# Patient Record
Sex: MALE | Race: WHITE | HISPANIC OR LATINO | Employment: FULL TIME | URBAN - METROPOLITAN AREA
[De-identification: names, ages, dates, MRNs, and addresses within clinical notes are randomized per-mention and may not be internally consistent; named-entity substitution may affect disease eponyms.]

---

## 2021-03-30 ENCOUNTER — BULK ORDERING (OUTPATIENT)
Dept: CASE MANAGEMENT | Facility: OTHER | Age: 51
End: 2021-03-30

## 2021-03-30 DIAGNOSIS — Z23 IMMUNIZATION DUE: ICD-10-CM

## 2023-02-23 ENCOUNTER — OFFICE VISIT (OUTPATIENT)
Dept: FAMILY MEDICINE CLINIC | Facility: CLINIC | Age: 53
End: 2023-02-23
Payer: COMMERCIAL

## 2023-02-23 VITALS
SYSTOLIC BLOOD PRESSURE: 136 MMHG | TEMPERATURE: 97.1 F | RESPIRATION RATE: 14 BRPM | HEART RATE: 73 BPM | WEIGHT: 203.2 LBS | OXYGEN SATURATION: 97 % | BODY MASS INDEX: 27.52 KG/M2 | DIASTOLIC BLOOD PRESSURE: 80 MMHG | HEIGHT: 72 IN

## 2023-02-23 DIAGNOSIS — Z12.5 PROSTATE CANCER SCREENING: ICD-10-CM

## 2023-02-23 DIAGNOSIS — Z12.11 SCREEN FOR COLON CANCER: ICD-10-CM

## 2023-02-23 DIAGNOSIS — E56.9 VITAMIN DEFICIENCY: ICD-10-CM

## 2023-02-23 DIAGNOSIS — E55.9 VITAMIN D DEFICIENCY: ICD-10-CM

## 2023-02-23 DIAGNOSIS — R63.5 WEIGHT GAIN: ICD-10-CM

## 2023-02-23 DIAGNOSIS — Z00.00 HEALTH MAINTENANCE EXAMINATION: Primary | ICD-10-CM

## 2023-02-23 PROCEDURE — 93000 ELECTROCARDIOGRAM COMPLETE: CPT | Performed by: NURSE PRACTITIONER

## 2023-02-23 PROCEDURE — 99386 PREV VISIT NEW AGE 40-64: CPT | Performed by: NURSE PRACTITIONER

## 2023-02-23 RX ORDER — SILDENAFIL 100 MG/1
TABLET, FILM COATED ORAL
Qty: 30 TABLET | Refills: 5 | Status: SHIPPED | OUTPATIENT
Start: 2023-02-23

## 2023-02-23 RX ORDER — TRIAMCINOLONE ACETONIDE 1 MG/G
CREAM TOPICAL
COMMUNITY
Start: 2023-01-26

## 2023-02-23 RX ORDER — PHENTERMINE HYDROCHLORIDE 37.5 MG/1
37.5 TABLET ORAL
Qty: 30 TABLET | Refills: 0 | Status: SHIPPED | OUTPATIENT
Start: 2023-02-23 | End: 2023-03-23 | Stop reason: SDUPTHER

## 2023-02-23 NOTE — PROGRESS NOTES
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  Physical fitness is required for his job as well as to maintain a healthy weight he has difficulty getting his jacket on his rather uniform with a weight gain.  Over the last year.  Has a busy schedule,   And has eaten more fast food than his normal baseline.  He would like to discuss if there is any weight loss options pharmacological options at least temporarily.  Also he has noticed that erections do not have the quality as previous and is wondering if there is anything here that can be done his libido is good no voice changes and he has plenty of facial hair testosterone borderline low in the past he had discussions with his doctors at that time urology and it was felt that he needed no testosterone replacement.  Takes care of himself he likes to run tries to eat healthy.  He does not have any chest pain shortness of breath headaches dizziness he has no history of diabetes hypertension seizure disorder  Or other chronic health conditions  No history of drug abuse or addiction or strong family history or other contraindications to weight loss therapy.  He has not had a colonoscopy  PSA quite a few years ago    Past medical history healthy  Mild ldh elev    Social history  , lived in [place] he is traveled around here with a teenager and  12-year-old in [place] and 3 others living elsewhere in the country as he is traveled around.  He is    Family history no early prostate cancer or colon cancer    Social history continue  13  to18  2 go to [employer]  3 other children  No tob no etoh  "    Fh  Parents  diab  No prost can   No colon cancer                Objective   Vital Signs:  /80   Pulse 73   Temp 97.1 °F (36.2 °C) (Infrared)   Resp 14   Ht 182.9 cm (72\")   Wt 92.2 kg (203 lb 3.2 oz)   SpO2 97%   BMI 27.56 kg/m²   Estimated body mass index is 27.56 kg/m² as calculated from the following:    Height as of this encounter: 182.9 cm (72\").    Weight as of this encounter: 92.2 kg (203 lb 3.2 oz).             Physical Exam  Vitals reviewed.   Constitutional:       General: He is not in acute distress.     Appearance: Normal appearance. He is well-developed. He is not ill-appearing, toxic-appearing or diaphoretic.   HENT:      Head: Normocephalic.      Right Ear: Tympanic membrane normal.      Left Ear: Tympanic membrane normal.      Nose: Nose normal.      Mouth/Throat:      Mouth: Mucous membranes are dry.   Eyes:      General: No scleral icterus.     Extraocular Movements: Extraocular movements intact.      Conjunctiva/sclera: Conjunctivae normal.      Pupils: Pupils are equal, round, and reactive to light.   Neck:      Thyroid: No thyromegaly.      Vascular: No JVD.      Comments: Carotids clear thyroid normal  Cardiovascular:      Rate and Rhythm: Normal rate and regular rhythm.      Heart sounds: Normal heart sounds. No murmur heard.    No friction rub. No gallop.   Pulmonary:      Effort: Pulmonary effort is normal. No respiratory distress.      Breath sounds: Normal breath sounds. No stridor. No wheezing or rales.   Abdominal:      General: Bowel sounds are normal. There is no distension.      Palpations: Abdomen is soft.      Tenderness: There is no abdominal tenderness.      Comments: No hepatosplenomegaly, no ascites, mild abdominal obesity  Otherwise normal exam     Genitourinary:     Comments: Testicular exam normal standing Valsalva no hernia  Digital rectal exam discussed guidelines not recommending however offered patient declines  Musculoskeletal:         General: No " tenderness. Normal range of motion.      Cervical back: Neck supple.   Lymphadenopathy:      Cervical: No cervical adenopathy.   Skin:     General: Skin is warm and dry.      Capillary Refill: Capillary refill takes less than 2 seconds.      Findings: No erythema or rash.   Neurological:      General: No focal deficit present.      Mental Status: He is alert and oriented to person, place, and time. Mental status is at baseline.      Cranial Nerves: No cranial nerve deficit.      Sensory: No sensory deficit.      Motor: No weakness.      Coordination: Coordination normal.      Gait: Gait normal.      Deep Tendon Reflexes: Reflexes are normal and symmetric. Reflexes normal.   Psychiatric:         Mood and Affect: Mood normal.         Behavior: Behavior normal.         Thought Content: Thought content normal.         Judgment: Judgment normal.        Result Review :                   Assessment and Plan   Diagnoses and all orders for this visit:    1. Health maintenance examination (Primary)  -     CBC & Differential  -     Comprehensive Metabolic Panel  -     Lipid Panel With LDL / HDL Ratio  -     TSH Rfx On Abnormal To Free T4  -     Urinalysis With Microscopic If Indicated (No Culture) - Urine, Clean Catch  -     ToxASSURE Select 13 (MW) - Urine, Clean Catch  -     PSA Screen  -     Cancel: Vitamin D,25-Hydroxy  -     ECG 12 Lead  -     Vitamin D,25-Hydroxy    2. Weight gain  -     CBC & Differential  -     Comprehensive Metabolic Panel  -     Lipid Panel With LDL / HDL Ratio  -     TSH Rfx On Abnormal To Free T4  -     Urinalysis With Microscopic If Indicated (No Culture) - Urine, Clean Catch  -     ToxASSURE Select 13 (MW) - Urine, Clean Catch  -     PSA Screen  -     Cancel: Vitamin D,25-Hydroxy  -     phentermine (ADIPEX-P) 37.5 MG tablet; Take 1 tablet by mouth Every Morning Before Breakfast.  Dispense: 30 tablet; Refill: 0    3. BMI 27.0-27.9,adult  -     CBC & Differential  -     Comprehensive Metabolic  Panel  -     Lipid Panel With LDL / HDL Ratio  -     TSH Rfx On Abnormal To Free T4  -     Urinalysis With Microscopic If Indicated (No Culture) - Urine, Clean Catch  -     ToxASSURE Select 13 (MW) - Urine, Clean Catch  -     PSA Screen  -     Cancel: Vitamin D,25-Hydroxy  -     phentermine (ADIPEX-P) 37.5 MG tablet; Take 1 tablet by mouth Every Morning Before Breakfast.  Dispense: 30 tablet; Refill: 0    4. Vitamin D deficiency  -     Vitamin D,25-Hydroxy    5. Prostate cancer screening  -     CBC & Differential  -     Comprehensive Metabolic Panel  -     Lipid Panel With LDL / HDL Ratio  -     TSH Rfx On Abnormal To Free T4  -     Urinalysis With Microscopic If Indicated (No Culture) - Urine, Clean Catch  -     ToxASSURE Select 13 (MW) - Urine, Clean Catch  -     PSA Screen  -     Cancel: Vitamin D,25-Hydroxy    6. Screen for colon cancer  -     Ambulatory Referral For Screening Colonoscopy    7. Vitamin deficiency  -     CBC & Differential  -     Comprehensive Metabolic Panel  -     Lipid Panel With LDL / HDL Ratio  -     TSH Rfx On Abnormal To Free T4  -     Urinalysis With Microscopic If Indicated (No Culture) - Urine, Clean Catch  -     ToxASSURE Select 13 (MW) - Urine, Clean Catch  -     PSA Screen  -     Cancel: Vitamin D,25-Hydroxy    Other orders  -     sildenafil (Viagra) 100 MG tablet; 1/2 to 1 tablet by mouth approximately 1 hour prior to need daily  Dispense: 30 tablet; Refill: 5             Follow Up   No follow-ups on file.  Patient was given instructions and counseling regarding his condition or for health maintenance advice. Please see specific information pulled into the AVS if appropriate.       Health maintenance,  See patient plan above plan for colonoscopy Labs counseled patient healthy diet exercise    Weight gain, elevated BMI  Discussed weight watchers dietary changes conservative options  He is really not a candidate for any long-term weight loss therapy   He does have elevated waist  measurement abdominal obesity elevated BMI  And his blood pressure was prehypertensive or in the hypertensive range today although he has no history of hypertension he will recheck    Discussed options including short limited phentermine course no more than 3 months  Which may help decrease his appetite during transition to improve diet to allow him time to make better habits  He has had some difficulty struggling changing his diet more recently  Risk-benefit discussed thoroughly may risk elevated blood pressure rapid heart rate stroke heart attack which would be rare  He is generally low risk  But should he take the medication he should stop if elevated blood pressure palpitations headaches dizziness or other difficulties  He would not be a candidate for any long-term use of this medication   Discussed risk of abuse diversion need for controlled subs agreement Wild appropriate use storage    Discharge instructions  Dietary changes, start reading labels, keep calories less than 2000 around 1800 daily  Vegetables chicken fish much less bread Posta portion control  64 ounces water daily less red meat  Consider intermittent fasting  Gradually increase exercise as well  Change people places and things  He will need to give me a monthly check and either in the office or by email  How he is feeling and doing make sure his blood pressure is doing well controlled  And how his progress is with the medication we will do this for 3 months    Wild clear  Controlled subs agreement requested and obtained  Tox assure baseline  ECG normal

## 2023-03-01 LAB
25(OH)D3+25(OH)D2 SERPL-MCNC: 20 NG/ML (ref 30–100)
ALBUMIN SERPL-MCNC: 5.1 G/DL (ref 3.5–5.2)
ALBUMIN/GLOB SERPL: 2 G/DL
ALP SERPL-CCNC: 56 U/L (ref 39–117)
ALT SERPL-CCNC: 17 U/L (ref 1–41)
APPEARANCE UR: CLEAR
AST SERPL-CCNC: 18 U/L (ref 1–40)
BASOPHILS # BLD AUTO: 0.03 10*3/MM3 (ref 0–0.2)
BASOPHILS NFR BLD AUTO: 0.5 % (ref 0–1.5)
BILIRUB SERPL-MCNC: 0.8 MG/DL (ref 0–1.2)
BILIRUB UR QL STRIP: NEGATIVE
BUN SERPL-MCNC: 11 MG/DL (ref 6–20)
BUN/CREAT SERPL: 11.3 (ref 7–25)
CALCIUM SERPL-MCNC: 9.6 MG/DL (ref 8.6–10.5)
CHLORIDE SERPL-SCNC: 103 MMOL/L (ref 98–107)
CHOLEST SERPL-MCNC: 236 MG/DL (ref 0–200)
CO2 SERPL-SCNC: 27.7 MMOL/L (ref 22–29)
COLOR UR: YELLOW
CREAT SERPL-MCNC: 0.97 MG/DL (ref 0.76–1.27)
DRUGS UR: NORMAL
EGFRCR SERPLBLD CKD-EPI 2021: 93.9 ML/MIN/1.73
EOSINOPHIL # BLD AUTO: 0.11 10*3/MM3 (ref 0–0.4)
EOSINOPHIL NFR BLD AUTO: 1.9 % (ref 0.3–6.2)
ERYTHROCYTE [DISTWIDTH] IN BLOOD BY AUTOMATED COUNT: 13 % (ref 12.3–15.4)
GLOBULIN SER CALC-MCNC: 2.5 GM/DL
GLUCOSE SERPL-MCNC: 98 MG/DL (ref 65–99)
GLUCOSE UR QL STRIP: NEGATIVE
HCT VFR BLD AUTO: 42.4 % (ref 37.5–51)
HDLC SERPL-MCNC: 54 MG/DL (ref 40–60)
HGB BLD-MCNC: 15 G/DL (ref 13–17.7)
HGB UR QL STRIP: NEGATIVE
IMM GRANULOCYTES # BLD AUTO: 0.03 10*3/MM3 (ref 0–0.05)
IMM GRANULOCYTES NFR BLD AUTO: 0.5 % (ref 0–0.5)
KETONES UR QL STRIP: NEGATIVE
LDLC SERPL CALC-MCNC: 160 MG/DL (ref 0–100)
LDLC/HDLC SERPL: 2.92 {RATIO}
LEUKOCYTE ESTERASE UR QL STRIP: NEGATIVE
LYMPHOCYTES # BLD AUTO: 1.64 10*3/MM3 (ref 0.7–3.1)
LYMPHOCYTES NFR BLD AUTO: 27.9 % (ref 19.6–45.3)
MCH RBC QN AUTO: 32.2 PG (ref 26.6–33)
MCHC RBC AUTO-ENTMCNC: 35.4 G/DL (ref 31.5–35.7)
MCV RBC AUTO: 91 FL (ref 79–97)
MONOCYTES # BLD AUTO: 0.45 10*3/MM3 (ref 0.1–0.9)
MONOCYTES NFR BLD AUTO: 7.7 % (ref 5–12)
NEUTROPHILS # BLD AUTO: 3.61 10*3/MM3 (ref 1.7–7)
NEUTROPHILS NFR BLD AUTO: 61.5 % (ref 42.7–76)
NITRITE UR QL STRIP: NEGATIVE
NRBC BLD AUTO-RTO: 0 /100 WBC (ref 0–0.2)
PH UR STRIP: 6.5 [PH] (ref 5–8)
PLATELET # BLD AUTO: 231 10*3/MM3 (ref 140–450)
POTASSIUM SERPL-SCNC: 4.2 MMOL/L (ref 3.5–5.2)
PROT SERPL-MCNC: 7.6 G/DL (ref 6–8.5)
PROT UR QL STRIP: NEGATIVE
PSA SERPL-MCNC: 1.34 NG/ML (ref 0–4)
RBC # BLD AUTO: 4.66 10*6/MM3 (ref 4.14–5.8)
SODIUM SERPL-SCNC: 140 MMOL/L (ref 136–145)
SP GR UR STRIP: 1.01 (ref 1–1.03)
TRIGL SERPL-MCNC: 122 MG/DL (ref 0–150)
TSH SERPL DL<=0.005 MIU/L-ACNC: 1.07 UIU/ML (ref 0.27–4.2)
UROBILINOGEN UR STRIP-MCNC: NORMAL MG/DL
VLDLC SERPL CALC-MCNC: 22 MG/DL (ref 5–40)
WBC # BLD AUTO: 5.87 10*3/MM3 (ref 3.4–10.8)

## 2023-03-09 ENCOUNTER — TELEPHONE (OUTPATIENT)
Dept: FAMILY MEDICINE CLINIC | Facility: CLINIC | Age: 53
End: 2023-03-09
Payer: COMMERCIAL

## 2023-03-09 NOTE — TELEPHONE ENCOUNTER
PT CALLED IN AND INFORMED HIM OF HIS LAB RESULTS AND WHAT JAMES EPLEY APRN NOTED. PT VOICED UNDERSTANDING. PT WAS ALSO WANTING TO KNOW IF ANA RECEIVED PAPERWORK THAT WAS SENT OVER FROM THE PTS WORK.   INFORMED PT ANA WAS OUT OF THE OFFICE AT THE MOMENT BUT THAT SHE WOULD FOLLOW BACK UP WITH HIM.     PLEASE ADVISE. THANK YOU.

## 2023-03-13 ENCOUNTER — TELEPHONE (OUTPATIENT)
Dept: FAMILY MEDICINE CLINIC | Facility: CLINIC | Age: 53
End: 2023-03-13

## 2023-03-23 ENCOUNTER — OFFICE VISIT (OUTPATIENT)
Dept: FAMILY MEDICINE CLINIC | Facility: CLINIC | Age: 53
End: 2023-03-23
Payer: COMMERCIAL

## 2023-03-23 VITALS
SYSTOLIC BLOOD PRESSURE: 138 MMHG | OXYGEN SATURATION: 97 % | HEIGHT: 72 IN | BODY MASS INDEX: 26.55 KG/M2 | RESPIRATION RATE: 14 BRPM | DIASTOLIC BLOOD PRESSURE: 76 MMHG | WEIGHT: 196 LBS | TEMPERATURE: 97.5 F | HEART RATE: 80 BPM

## 2023-03-23 DIAGNOSIS — R03.0 PREHYPERTENSION: ICD-10-CM

## 2023-03-23 DIAGNOSIS — R63.5 WEIGHT GAIN: Primary | ICD-10-CM

## 2023-03-23 DIAGNOSIS — E55.9 VITAMIN D DEFICIENCY: ICD-10-CM

## 2023-03-23 PROCEDURE — 99213 OFFICE O/P EST LOW 20 MIN: CPT | Performed by: NURSE PRACTITIONER

## 2023-03-23 RX ORDER — PHENTERMINE HYDROCHLORIDE 37.5 MG/1
37.5 TABLET ORAL
Qty: 30 TABLET | Refills: 0 | Status: SHIPPED | OUTPATIENT
Start: 2023-03-23

## 2023-03-23 NOTE — PATIENT INSTRUCTIONS
Discharge instructions continue present plan      Looks like you doing great  Continue vitamin D3 2000 international units daily  Consider Metamucil namebrand 1 tablespoon large glass of water before dinner to decrease cholesterol decrease appetite decrease glucose absorption    As long as you are doing well follow-up in 1 month.

## 2023-03-23 NOTE — PROGRESS NOTES
"Chief Complaint  Weight Loss    Subjective        Lam Gillis presents to [facility]  History of Present Illness  Pleasant gentleman here today follow-up weight gain, abdominal obesity,  Vitamin D deficiency.  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  No problems with phentermine no palpitations or irritability or insomnia.  Weight Loss        Objective   Vital Signs:  /76 (BP Location: Left arm)   Pulse 80   Temp 97.5 °F (36.4 °C) (Temporal)   Resp 14   Ht 182.9 cm (72\")   Wt 88.9 kg (196 lb)   SpO2 97%   BMI 26.58 kg/m²   Estimated body mass index is 26.58 kg/m² as calculated from the following:    Height as of this encounter: 182.9 cm (72\").    Weight as of this encounter: 88.9 kg (196 lb).             Physical Exam  Constitutional:       Appearance: Normal appearance.   Eyes:      Pupils: Pupils are equal, round, and reactive to light.   Cardiovascular:      Rate and Rhythm: Normal rate and regular rhythm.      Pulses: Normal pulses.      Heart sounds: Normal heart sounds.   Pulmonary:      Effort: Pulmonary effort is normal.      Breath sounds: Normal breath sounds.   Neurological:      Mental Status: He is alert.   Psychiatric:         Mood and Affect: Mood normal.         Behavior: Behavior normal.         Thought Content: Thought content normal.         Judgment: Judgment normal.        Result Review :                   Assessment and Plan   Diagnoses and all orders for this visit:    1. Weight " gain (Primary)  -     phentermine (ADIPEX-P) 37.5 MG tablet; Take 1 tablet by mouth Every Morning Before Breakfast.  Dispense: 30 tablet; Refill: 0    2. BMI 27.0-27.9,adult  -     phentermine (ADIPEX-P) 37.5 MG tablet; Take 1 tablet by mouth Every Morning Before Breakfast.  Dispense: 30 tablet; Refill: 0    3. Vitamin D deficiency    4. Prehypertension             Follow Up   No follow-ups on file.  Patient was given instructions and counseling regarding his condition or for health maintenance advice. Please see specific information pulled into the AVS if appropriate.       Answers for HPI/ROS submitted by the patient on 3/17/2023  What is the primary reason for your visit?: Other  Please describe your symptoms.: Check up on weight loss.  I need to fill my thirty-day prescription for Phentermine  Have you had these symptoms before?: No  How long have you been having these symptoms?: Greater than 2 weeks  Please list any medications you are currently taking for this condition.: Phentermine 37.5  Please describe any probable cause for these symptoms. : none    Patient is doing well down 7 lbs    uite well with a short course of phentermine to help him get his habits improved and help control his appetite to keep him at his goal with the recent weight gain  He is having no chest pain palpitations increased anxiety or other issues  Blood pressure check today in the prehypertension range    He is to discontinue phentermine if he has any difficulties

## 2023-04-05 ENCOUNTER — PRE-PROCEDURE SCREENING (OUTPATIENT)
Dept: GASTROENTEROLOGY | Facility: CLINIC | Age: 53
End: 2023-04-05
Payer: COMMERCIAL

## 2023-04-05 NOTE — TELEPHONE ENCOUNTER
Colonoscopy screening--No personal history of polyps--No family history of polyps or colon cancer--No blood thinners--Medications:                    phentermine (ADIPEX-P) 37.5 MG tablet          QUESTIONNAIRE SCREENING  SCAN IN  MEDIA & HAS BEEN SENT TO DOCTOR FOR REVIEW

## 2023-04-23 ENCOUNTER — PREP FOR SURGERY (OUTPATIENT)
Dept: OTHER | Facility: HOSPITAL | Age: 53
End: 2023-04-23
Payer: COMMERCIAL

## 2023-04-23 DIAGNOSIS — Z12.11 SCREEN FOR COLON CANCER: Primary | ICD-10-CM

## 2023-04-25 ENCOUNTER — TELEPHONE (OUTPATIENT)
Dept: GASTROENTEROLOGY | Facility: CLINIC | Age: 53
End: 2023-04-25
Payer: COMMERCIAL

## 2023-05-08 ENCOUNTER — OFFICE VISIT (OUTPATIENT)
Dept: FAMILY MEDICINE CLINIC | Facility: CLINIC | Age: 53
End: 2023-05-08
Payer: COMMERCIAL

## 2023-05-08 VITALS
DIASTOLIC BLOOD PRESSURE: 87 MMHG | SYSTOLIC BLOOD PRESSURE: 150 MMHG | RESPIRATION RATE: 12 BRPM | WEIGHT: 192.7 LBS | HEART RATE: 69 BPM | BODY MASS INDEX: 26.1 KG/M2 | TEMPERATURE: 97 F | OXYGEN SATURATION: 98 % | HEIGHT: 72 IN

## 2023-05-08 DIAGNOSIS — R63.5 WEIGHT GAIN: ICD-10-CM

## 2023-05-08 DIAGNOSIS — R03.0 PREHYPERTENSION: Primary | ICD-10-CM

## 2023-05-08 PROCEDURE — 99213 OFFICE O/P EST LOW 20 MIN: CPT | Performed by: NURSE PRACTITIONER

## 2023-05-08 RX ORDER — PHENTERMINE HYDROCHLORIDE 37.5 MG/1
37.5 TABLET ORAL
Qty: 30 TABLET | Refills: 0 | Status: SHIPPED | OUTPATIENT
Start: 2023-05-08 | End: 2023-05-08 | Stop reason: SDUPTHER

## 2023-05-08 RX ORDER — PHENTERMINE HYDROCHLORIDE 37.5 MG/1
37.5 TABLET ORAL
Qty: 30 TABLET | Refills: 0 | Status: SHIPPED | OUTPATIENT
Start: 2023-05-08

## 2023-05-08 NOTE — PROGRESS NOTES
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  Temporary medication during transition back to his baseline.        Objective   Vital Signs:  /87   Pulse 69   Temp 97 °F (36.1 °C) (Temporal)   Resp 12   Ht 182.9 cm (72\")   Wt 87.4 kg (192 lb 11.2 oz)   SpO2 98%   BMI 26.13 kg/m²   Estimated body mass index is 26.13 kg/m² as calculated from the following:    Height as of this encounter: 182.9 cm (72\").    Weight as of this encounter: 87.4 kg (192 lb 11.2 oz).          Physical Exam   Result Review :                Assessment and Plan   Diagnoses and all orders for this visit:    1. Prehypertension (Primary)    2. BMI 27.0-27.9,adult  -     phentermine (ADIPEX-P) 37.5 MG tablet; Take 1 tablet by mouth Every Morning Before Breakfast.  Dispense: 30 tablet; Refill: 0    3. Weight gain  -     phentermine (ADIPEX-P) 37.5 MG tablet; Take 1 tablet by mouth Every Morning Before Breakfast.  Dispense: 30 tablet; Refill: 0           I spent 20  minutes caring for Lam on this date of service. This time includes time spent by me in the following activities:preparing for the visit, reviewing tests, obtaining and/or reviewing a separately obtained history, performing a medically appropriate examination " and/or evaluation , counseling and educating the patient/family/caregiver, ordering medications, tests, or procedures, documenting information in the medical record and care coordination  Follow Up   Return in about 1 month (around 6/8/2023).  Patient was given instructions and counseling regarding his condition or for health maintenance advice. Please see specific information pulled into the AVS if appropriate.     Patient Instructions   Discharge instructions  Continue present plan,  Continue phentermine  Check blood pressure weekly  If increased palpitations elevated blood pressure anxiety stop the medication and follow-up for emergency room otherwise follow-up in 1 month,  We will likely start tapering at that point,    Slow steady weight changes metabolic changes you are doing spectacular

## 2023-05-08 NOTE — PATIENT INSTRUCTIONS
Discharge instructions  Continue present plan,  Continue phentermine  Check blood pressure weekly  If increased palpitations elevated blood pressure anxiety stop the medication and follow-up for emergency room otherwise follow-up in 1 month,  We will likely start tapering at that point,    Slow steady weight changes metabolic changes you are doing spectacular

## 2023-05-08 NOTE — TELEPHONE ENCOUNTER
Lam quintero phentermine (ADIPEX-P) 37.5 MG tablet was sent to the wrong pharmacy supposed to be sent to [id number] - [place], [place] - [address] AT [facility] [address] - [phone] [facility] [phone] FX

## 2023-05-08 NOTE — TELEPHONE ENCOUNTER
Rx Refill Note  Requested Prescriptions     Pending Prescriptions Disp Refills   • phentermine (ADIPEX-P) 37.5 MG tablet 30 tablet 0     Sig: Take 1 tablet by mouth Every Morning Before Breakfast.      Last office visit with prescribing clinician: 5/8/2023   Last telemedicine visit with prescribing clinician: 6/8/2023   Next office visit with prescribing clinician: 6/8/2023                         Would you like a call back once the refill request has been completed: [] Yes [] No    If the office needs to give you a call back, can they leave a voicemail: [] Yes [] No    Clarice Barrera Rep  05/08/23, 12:27 EDT

## 2023-06-08 ENCOUNTER — OFFICE VISIT (OUTPATIENT)
Dept: FAMILY MEDICINE CLINIC | Facility: CLINIC | Age: 53
End: 2023-06-08
Payer: COMMERCIAL

## 2023-06-08 VITALS
WEIGHT: 188.8 LBS | DIASTOLIC BLOOD PRESSURE: 70 MMHG | BODY MASS INDEX: 25.57 KG/M2 | SYSTOLIC BLOOD PRESSURE: 140 MMHG | TEMPERATURE: 97.3 F | HEART RATE: 75 BPM | HEIGHT: 72 IN | OXYGEN SATURATION: 98 %

## 2023-06-08 DIAGNOSIS — R03.0 PREHYPERTENSION: Primary | ICD-10-CM

## 2023-06-08 DIAGNOSIS — R63.5 WEIGHT GAIN: ICD-10-CM

## 2023-06-08 PROCEDURE — 99213 OFFICE O/P EST LOW 20 MIN: CPT | Performed by: NURSE PRACTITIONER

## 2023-06-08 RX ORDER — PHENTERMINE HYDROCHLORIDE 37.5 MG/1
37.5 TABLET ORAL
Qty: 90 TABLET | Refills: 0 | Status: SHIPPED | OUTPATIENT
Start: 2023-06-08

## 2023-06-08 NOTE — PROGRESS NOTES
"Chief Complaint  Weight Check (Phentermine check up )    Subjective        Lam Gillis presents to [facility]  History of Present Illness  Pleasant patient here today follow-up abnormal weight gain, abdominal obesity, and some prehypertension.  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 and after the holidays.  He was 203 pounds at that time, and we had started  Short course of phentermine to help him get to his goals, as well as to help him treat prehypertension, and to avoid hypertension.  He has had no difficulties medication and he is down 7.5% or so weight, and he would like to continue for short period longer,  He just received news that he will be leaving in a couple weeks to move to [place], with his job.  And is already making arrangements.  No chest pain palpitations no insomnia irritability agitation or other difficulties medication.  He is low risk of diversion or any abuse.  And has been perfectly appropriate.    Objective   Vital Signs:  /70   Pulse 75   Temp 97.3 °F (36.3 °C) (Temporal)   Ht 182.9 cm (72\")   Wt 85.6 kg (188 lb 12.8 oz)   SpO2 98%   BMI 25.61 kg/m²   Estimated body mass index is 25.61 kg/m² as calculated from the following:    Height as of this encounter: 182.9 cm (72\").    Weight as of this encounter: 85.6 kg (188 lb 12.8 oz).          Physical Exam  Vitals reviewed.   Constitutional:       General: He is not in acute distress.     Appearance: Normal appearance. He is well-developed. He is not ill-appearing, toxic-appearing or diaphoretic.      Comments: Paul appears well physically fit.   HENT:      Head: Normocephalic.      Nose: Nose normal.   Eyes:      General: No scleral icterus.     Conjunctiva/sclera: Conjunctivae normal.      Pupils: Pupils are equal, round, and " reactive to light.   Neck:      Thyroid: No thyromegaly.      Vascular: No JVD.   Cardiovascular:      Rate and Rhythm: Normal rate and regular rhythm.      Heart sounds: Normal heart sounds. No murmur heard.    No friction rub. No gallop.   Pulmonary:      Effort: Pulmonary effort is normal. No respiratory distress.      Breath sounds: Normal breath sounds. No stridor. No wheezing or rales.   Abdominal:      General: Bowel sounds are normal. There is no distension.      Palpations: Abdomen is soft.      Tenderness: There is no abdominal tenderness.      Comments: No hepatosplenomegaly, no ascites,   Musculoskeletal:         General: No tenderness.      Cervical back: Neck supple.   Lymphadenopathy:      Cervical: No cervical adenopathy.   Skin:     General: Skin is warm and dry.      Findings: No erythema or rash.   Neurological:      Mental Status: He is alert and oriented to person, place, and time.      Deep Tendon Reflexes: Reflexes are normal and symmetric.   Psychiatric:         Mood and Affect: Mood normal.         Behavior: Behavior normal.         Thought Content: Thought content normal.         Judgment: Judgment normal.      Result Review :                Assessment and Plan   Diagnoses and all orders for this visit:    1. Prehypertension (Primary)    2. Weight gain  -     phentermine (ADIPEX-P) 37.5 MG tablet; Take 1 tablet by mouth Every Morning Before Breakfast.  Dispense: 90 tablet; Refill: 0    3. BMI 27.0-27.9,adult  -     phentermine (ADIPEX-P) 37.5 MG tablet; Take 1 tablet by mouth Every Morning Before Breakfast.  Dispense: 90 tablet; Refill: 0             Follow Up   No follow-ups on file.  Patient was given instructions and counseling regarding his condition or for health maintenance advice. Please see specific information pulled into the AVS if appropriate.     Ov 20 min counc  grter 50% exercise   diet exercise   transition off phen screening s    Patient Instructions   Discharge  instructions    Continue present plan  Continue phentermine this month,  Next month then would suggest  Using as needed  Over the next 1 to 2 months,    And transition away from phentermine but continue the healthy diet,  New PCP [place], let me know what I can do for you during the transition.  Any chest pain shortness of breath weakness emergency room    Sometime consider CT calcium score coronary arteries screening  Within the next 1 to 3 years as well as vascular screenings carotid aorta and lower extremities  Stay on top your colonoscopy screenings  Joint sparing exercise  Stay on top your PSA  Which you the best

## 2023-06-08 NOTE — PATIENT INSTRUCTIONS
Discharge instructions    Continue present plan  Continue phentermine this month,  Next month then would suggest  Using as needed  Over the next 1 to 2 months,    And transition away from phentermine but continue the healthy diet,  New PCP [place], let me know what I can do for you during the transition.  Any chest pain shortness of breath weakness emergency room    Sometime consider CT calcium score coronary arteries screening  Within the next 1 to 3 years as well as vascular screenings carotid aorta and lower extremities  Stay on top your colonoscopy screenings  Joint sparing exercise  Stay on top your PSA  Which you the best